# Patient Record
Sex: MALE | Race: WHITE | NOT HISPANIC OR LATINO | URBAN - METROPOLITAN AREA
[De-identification: names, ages, dates, MRNs, and addresses within clinical notes are randomized per-mention and may not be internally consistent; named-entity substitution may affect disease eponyms.]

---

## 2018-04-06 ENCOUNTER — SURGERY (OUTPATIENT)
Dept: URBAN - METROPOLITAN AREA SURGERY 5 | Facility: SURGERY | Age: 63
End: 2018-04-06
Payer: COMMERCIAL

## 2018-04-06 ENCOUNTER — CONSULT (OUTPATIENT)
Dept: URBAN - METROPOLITAN AREA CLINIC 10 | Facility: CLINIC | Age: 63
End: 2018-04-06
Payer: COMMERCIAL

## 2018-04-06 PROCEDURE — 67108 REPAIR DETACHED RETINA: CPT | Performed by: OPHTHALMOLOGY

## 2018-04-06 PROCEDURE — 92134 CPTRZ OPH DX IMG PST SGM RTA: CPT | Performed by: OPHTHALMOLOGY

## 2018-04-06 PROCEDURE — 99205 OFFICE O/P NEW HI 60 MIN: CPT | Performed by: OPHTHALMOLOGY

## 2018-04-06 RX ORDER — OFLOXACIN 3 MG/ML
0.3 % SOLUTION/ DROPS OPHTHALMIC
Qty: 1 | Refills: 0 | Status: INACTIVE
Start: 2018-04-06 | End: 2018-04-20

## 2018-04-06 RX ORDER — PREDNISOLONE ACETATE 10 MG/ML
1 % SUSPENSION/ DROPS OPHTHALMIC
Qty: 1 | Refills: 1 | Status: INACTIVE
Start: 2018-04-06 | End: 2018-05-17

## 2018-04-06 ASSESSMENT — INTRAOCULAR PRESSURE
OS: 18
OD: 19
OD: 19
OS: 18

## 2018-04-07 ENCOUNTER — POST OP (OUTPATIENT)
Dept: URBAN - METROPOLITAN AREA CLINIC 10 | Facility: CLINIC | Age: 63
End: 2018-04-07

## 2018-04-07 PROCEDURE — 99024 POSTOP FOLLOW-UP VISIT: CPT | Performed by: OPTOMETRIST

## 2018-04-07 ASSESSMENT — INTRAOCULAR PRESSURE: OD: 23

## 2018-04-20 ENCOUNTER — FOLLOW UP ESTABLISHED (OUTPATIENT)
Dept: URBAN - METROPOLITAN AREA CLINIC 10 | Facility: CLINIC | Age: 63
End: 2018-04-20
Payer: COMMERCIAL

## 2018-04-20 DIAGNOSIS — H25.13 AGE-RELATED NUCLEAR CATARACT, BILATERAL: ICD-10-CM

## 2018-04-20 DIAGNOSIS — H43.813 VITREOUS DEGENERATION, BILATERAL: ICD-10-CM

## 2018-04-20 PROCEDURE — 92134 CPTRZ OPH DX IMG PST SGM RTA: CPT | Performed by: OPHTHALMOLOGY

## 2018-04-20 PROCEDURE — 99024 POSTOP FOLLOW-UP VISIT: CPT | Performed by: OPHTHALMOLOGY

## 2018-04-20 ASSESSMENT — INTRAOCULAR PRESSURE
OD: 22
OS: 15

## 2018-05-17 ENCOUNTER — FOLLOW UP ESTABLISHED (OUTPATIENT)
Dept: URBAN - METROPOLITAN AREA CLINIC 10 | Facility: CLINIC | Age: 63
End: 2018-05-17
Payer: MEDICARE

## 2018-05-17 DIAGNOSIS — H33.021 RETINAL DETACHMENT WITH MULTIPLE BREAKS, RIGHT EYE: ICD-10-CM

## 2018-05-17 PROCEDURE — 92014 COMPRE OPH EXAM EST PT 1/>: CPT | Performed by: OPTOMETRIST

## 2018-05-17 ASSESSMENT — VISUAL ACUITY
OS: 20/20
OD: 20/30

## 2018-05-17 ASSESSMENT — INTRAOCULAR PRESSURE
OD: 13
OS: 13

## 2018-05-17 ASSESSMENT — KERATOMETRY
OD: 42.38
OS: 42.38

## 2019-02-19 ENCOUNTER — OFFICE VISIT (OUTPATIENT)
Dept: URBAN - METROPOLITAN AREA CLINIC 45 | Facility: CLINIC | Age: 64
End: 2019-02-19
Payer: MEDICARE

## 2019-02-19 DIAGNOSIS — H59.811 CHORIORETINAL SCARS AFTER SURGERY FOR DETACHMENT, RIGHT EYE: ICD-10-CM

## 2019-02-19 DIAGNOSIS — H25.811 COMBINED FORMS OF AGE-RELATED CATARACT, RIGHT EYE: Primary | ICD-10-CM

## 2019-02-19 DIAGNOSIS — H25.12 AGE-RELATED NUCLEAR CATARACT, LEFT EYE: ICD-10-CM

## 2019-02-19 PROCEDURE — 92014 COMPRE OPH EXAM EST PT 1/>: CPT | Performed by: OPHTHALMOLOGY

## 2019-02-19 RX ORDER — OFLOXACIN 3 MG/ML
0.3 % SOLUTION/ DROPS OPHTHALMIC
Qty: 10 | Refills: 0 | Status: INACTIVE
Start: 2019-02-19 | End: 2021-05-06

## 2019-02-19 RX ORDER — PREDNISOLONE ACETATE 10 MG/ML
1 % SUSPENSION/ DROPS OPHTHALMIC
Qty: 10 | Refills: 1 | Status: INACTIVE
Start: 2019-02-19 | End: 2019-02-21

## 2019-02-19 ASSESSMENT — VISUAL ACUITY
OD: 20/80
OS: 20/50

## 2019-02-19 ASSESSMENT — INTRAOCULAR PRESSURE
OD: 12
OS: 13

## 2019-02-19 ASSESSMENT — KERATOMETRY
OS: 41.88
OD: 42.13

## 2019-02-19 NOTE — IMPRESSION/PLAN
Impression: Age-related nuclear cataract, left eye: H25.12. Plan: Discussed diagnosis in detail with patient. Advised patient of condition. Reassured patient of current condition and treatment. Will continue to observe condition and or symptoms.

## 2019-02-19 NOTE — IMPRESSION/PLAN
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 Plan: OD: Discussed diagnosis in detail with patient. No treatment is required at this time. Will continue to observe condition and or symptoms. Discussed BCVA after ce/iol.

## 2019-02-19 NOTE — IMPRESSION/PLAN
Impression: Combined forms of age-related cataract, right eye: H25.811. Plan: OK for ce/iol OD in [address] 25, RL2. Distance target. Standard IOL due to previous RD. Discussed diagnosis of cataracts. Cataracts are limiting vision. Discussed BCVA due to Retina. Discussed risks, benefits and alternatives to surgery including but not limited to: bleeding, infection, risk of vision loss, loss of the eye, need for other surgery. Patient voiced understanding and wishes to proceed.

## 2019-02-21 RX ORDER — DUREZOL 0.5 MG/ML
0.05 % EMULSION OPHTHALMIC
Qty: 1 | Refills: 1 | Status: INACTIVE
Start: 2019-02-21 | End: 2021-05-06

## 2019-02-26 ENCOUNTER — TESTING ONLY (OUTPATIENT)
Dept: URBAN - METROPOLITAN AREA CLINIC 45 | Facility: CLINIC | Age: 64
End: 2019-02-26
Payer: MEDICARE

## 2019-02-26 PROCEDURE — 92136 OPHTHALMIC BIOMETRY: CPT | Performed by: OPHTHALMOLOGY

## 2019-02-26 ASSESSMENT — PACHYMETRY
OD: 24.23
OD: 3.58
OS: 3.36
OS: 24.11

## 2019-03-06 ENCOUNTER — SURGERY (OUTPATIENT)
Dept: URBAN - METROPOLITAN AREA SURGERY 20 | Facility: SURGERY | Age: 64
End: 2019-03-06
Payer: MEDICARE

## 2019-03-06 PROCEDURE — 66984 XCAPSL CTRC RMVL W/O ECP: CPT | Performed by: OPHTHALMOLOGY

## 2019-03-07 ENCOUNTER — POST-OPERATIVE VISIT (OUTPATIENT)
Dept: URBAN - METROPOLITAN AREA CLINIC 45 | Facility: CLINIC | Age: 64
End: 2019-03-07

## 2019-03-07 DIAGNOSIS — Z09 ENCNTR FOR F/U EXAM AFT TRTMT FOR COND OTH THAN MALIG NEOPLM: Primary | ICD-10-CM

## 2019-03-07 PROCEDURE — 99024 POSTOP FOLLOW-UP VISIT: CPT | Performed by: OPTOMETRIST

## 2019-03-07 ASSESSMENT — INTRAOCULAR PRESSURE
OD: 17
OS: 13

## 2019-03-14 ENCOUNTER — POST-OPERATIVE VISIT (OUTPATIENT)
Dept: URBAN - METROPOLITAN AREA CLINIC 45 | Facility: CLINIC | Age: 64
End: 2019-03-14

## 2019-03-14 PROCEDURE — 99024 POSTOP FOLLOW-UP VISIT: CPT | Performed by: OPTOMETRIST

## 2019-03-14 ASSESSMENT — INTRAOCULAR PRESSURE
OS: 16
OD: 16

## 2020-02-27 ENCOUNTER — OFFICE VISIT (OUTPATIENT)
Dept: URBAN - METROPOLITAN AREA CLINIC 45 | Facility: CLINIC | Age: 65
End: 2020-02-27
Payer: MEDICARE

## 2020-02-27 DIAGNOSIS — H52.4 PRESBYOPIA: ICD-10-CM

## 2020-02-27 PROCEDURE — 92014 COMPRE OPH EXAM EST PT 1/>: CPT | Performed by: OPTOMETRIST

## 2020-02-27 PROCEDURE — 92134 CPTRZ OPH DX IMG PST SGM RTA: CPT | Performed by: OPTOMETRIST

## 2020-02-27 ASSESSMENT — INTRAOCULAR PRESSURE
OD: 14
OS: 14

## 2020-02-27 ASSESSMENT — VISUAL ACUITY
OS: 20/20
OD: 20/40

## 2021-05-06 ENCOUNTER — OFFICE VISIT (OUTPATIENT)
Dept: URBAN - METROPOLITAN AREA CLINIC 45 | Facility: CLINIC | Age: 66
End: 2021-05-06
Payer: MEDICARE

## 2021-05-06 DIAGNOSIS — H35.371 PUCKERING OF MACULA, RIGHT EYE: Primary | ICD-10-CM

## 2021-05-06 DIAGNOSIS — H10.45 OTHER CHRONIC ALLERGIC CONJUNCTIVITIS: ICD-10-CM

## 2021-05-06 DIAGNOSIS — H25.812 AGE-RELATED CATARACT OF LEFT EYE: ICD-10-CM

## 2021-05-06 PROCEDURE — 92014 COMPRE OPH EXAM EST PT 1/>: CPT | Performed by: OPTOMETRIST

## 2021-05-06 PROCEDURE — 92134 CPTRZ OPH DX IMG PST SGM RTA: CPT | Performed by: OPTOMETRIST

## 2021-05-06 ASSESSMENT — VISUAL ACUITY
OD: 20/30
OS: 20/25

## 2021-05-06 ASSESSMENT — INTRAOCULAR PRESSURE
OS: 14
OD: 14

## 2021-05-06 NOTE — IMPRESSION/PLAN
Impression: Age-related cataract of left eye: H25.812. Plan: No treatment is required at this time. Will continue to observe condition and or symptoms. Call if [address] worsens.

## 2021-05-06 NOTE — IMPRESSION/PLAN
Impression: Puckering of macula, right eye: H35.371. Plan: OCT shows Pucker OD, Normal OS, Stable. No treatment is required at this time. Will continue to observe condition and or symptoms.

## 2021-07-09 ENCOUNTER — OFFICE VISIT (OUTPATIENT)
Dept: URBAN - METROPOLITAN AREA CLINIC 45 | Facility: CLINIC | Age: 66
End: 2021-07-09
Payer: MEDICARE

## 2021-07-09 DIAGNOSIS — I63.9 STROKE: Primary | ICD-10-CM

## 2021-07-09 PROCEDURE — 92134 CPTRZ OPH DX IMG PST SGM RTA: CPT | Performed by: OPTOMETRIST

## 2021-07-09 PROCEDURE — 92014 COMPRE OPH EXAM EST PT 1/>: CPT | Performed by: OPTOMETRIST

## 2021-07-09 ASSESSMENT — INTRAOCULAR PRESSURE
OD: 14
OS: 16

## 2021-07-09 NOTE — IMPRESSION/PLAN
Impression: Age-related nuclear cataract, left eye: H25.12. Plan: Discussed diagnosis in detail with patient. No treatment is required at this time. Will continue to observe condition and or symptoms. Call if [facility] worsens.

## 2021-07-09 NOTE — IMPRESSION/PLAN
Impression: Puckering of macula, right eye: H35.371. Plan: OCT shows Pucker OD, Normal Mac OS, Stable. No treatment is required at this time. Will continue to observe condition and or symptoms.

## 2022-10-10 ENCOUNTER — OFFICE VISIT (OUTPATIENT)
Dept: URBAN - METROPOLITAN AREA CLINIC 45 | Facility: CLINIC | Age: 67
End: 2022-10-10
Payer: MEDICARE

## 2022-10-10 DIAGNOSIS — H35.371 PUCKERING OF MACULA, RIGHT EYE: ICD-10-CM

## 2022-10-10 DIAGNOSIS — H10.13 ACUTE ATOPIC CONJUNCTIVITIS, BILATERAL: ICD-10-CM

## 2022-10-10 DIAGNOSIS — H25.12 AGE-RELATED NUCLEAR CATARACT, LEFT EYE: Primary | ICD-10-CM

## 2022-10-10 PROCEDURE — 92014 COMPRE OPH EXAM EST PT 1/>: CPT | Performed by: OPTOMETRIST

## 2022-10-10 PROCEDURE — 92134 CPTRZ OPH DX IMG PST SGM RTA: CPT | Performed by: OPTOMETRIST

## 2022-10-10 RX ORDER — PREDNISOLONE ACETATE 10 MG/ML
1 % SUSPENSION/ DROPS OPHTHALMIC
Qty: 1 | Refills: 0 | Status: ACTIVE
Start: 2022-10-10

## 2022-10-10 ASSESSMENT — VISUAL ACUITY: OD: 20/30

## 2022-10-10 ASSESSMENT — INTRAOCULAR PRESSURE
OS: 14
OD: 16

## 2022-10-10 NOTE — IMPRESSION/PLAN
Impression: Age-related nuclear cataract, left eye: H25.12. Plan: Discussed diagnosis in detail with patient. Surgery not indicated, will monitor.

## 2022-10-10 NOTE — IMPRESSION/PLAN
Impression: Acute atopic conjunctivitis, bilateral: H10.13. Plan: Start Pred-acetate bid ou x 1 week then d/c.